# Patient Record
Sex: FEMALE | Race: WHITE | NOT HISPANIC OR LATINO | Employment: OTHER | URBAN - METROPOLITAN AREA
[De-identification: names, ages, dates, MRNs, and addresses within clinical notes are randomized per-mention and may not be internally consistent; named-entity substitution may affect disease eponyms.]

---

## 2023-10-25 ENCOUNTER — EVALUATION (OUTPATIENT)
Dept: PHYSICAL THERAPY | Facility: CLINIC | Age: 78
End: 2023-10-25
Payer: COMMERCIAL

## 2023-10-25 DIAGNOSIS — G89.29 CHRONIC RIGHT SHOULDER PAIN: Primary | ICD-10-CM

## 2023-10-25 DIAGNOSIS — M25.511 CHRONIC RIGHT SHOULDER PAIN: Primary | ICD-10-CM

## 2023-10-25 PROCEDURE — 97161 PT EVAL LOW COMPLEX 20 MIN: CPT | Mod: GP

## 2023-10-25 PROCEDURE — 97140 MANUAL THERAPY 1/> REGIONS: CPT | Mod: GP

## 2023-10-25 NOTE — Clinical Note
October 26, 2023     Patient: Chris Robledo   YOB: 1945   Date of Visit: 10/25/2023       To Whom It May Concern:    It is my medical opinion that Chris Robledo {Work release (duty restriction):32673}.    If you have any questions or concerns, please don't hesitate to call.         Sincerely,        Miles Brantley, PT    CC: No Recipients

## 2023-10-25 NOTE — PROGRESS NOTES
Physical Therapy    Physical Therapy Evaluation    Patient Name: Chris Robledo  MRN: [id number]  Today's Date: 10/26/2023       Assessment  PT Assessment Results: Decreased strength, Decreased range of motion, Decreased endurance, Decreased mobility, Impaired balance  Rehab Prognosis: Fairabdi is a 78 year old F who participated in a physical therapy evaluation today due to chronic R shoulder pain. her impairments include; tenderness, strength, pain, ROM deficits. Due to these impairments, pt has the following functional limitations Overhead activities, lifting, performing household/recreational/ occupational activities, and performing ADLs]. Pt will benefit from continued skilled physical therapy to address above impairments and progress toward therapy related goals.       Plan  Treatment/Interventions: Cryotherapy, Education/ Instruction, Electrical stimulation, Manual therapy, Neuromuscular re-education, Therapeutic activities, Therapeutic exercises  PT Plan: Skilled PT  PT Frequency: Other (Comment) (biweekly)  Duration: 6 weeks  Onset Date: 04/20/23  Number of Treatments Authorized: 30  Rehab Potential: Fair  Plan of Care Agreement: Patient    Current Problem  1. Chronic right shoulder pain  Follow Up In Physical Therapy    CANCELED: Follow Up In Physical Therapy          Subjective   RFV: pt is a 66 y/o F presenting to the clinic with history of chronic R shoulder pain as a result of a fall that lead to a fracture earlier this year. Pt was being seen in PT and was making progress for R shoulder rehab but had to pause PT [place]. Pt would like to return to PT to [facility] to make progress with her R shoulder.   DOS:  Pain: 0/10  Description of symptoms: shoulder feels stiff past 90*   Acute/Chronic: chronic  Functional limitations: OH elevation, lifting, carrying, washing hair    Pt may have potential BPPV which is a new symptom, will get evaluated by MD on Friday- be cautious with  supine exercises.         Objective   Posture:     Range of Motion:  R shoulder flexion: 0-100  R shoulder abudction : 0-82  R shoulder ER: 0-35     Strength:   RUE: 3+/5  LUE: 5/5  Flexibility:     Palpation:    Tenderness noted along R shoulder, R UT  Special Tests:   N/A           OP EDUCATION:  Education  Individual(s) Educated: Patient, Child  Education Provided: Anatomy, Physiology  Risk and Benefits Discussed with Patient/Caregiver/Other: yes  Patient/Caregiver Demonstrated Understanding: yes  Plan of Care Discussed and Agreed Upon: yes  Patient Response to Education: Patient/Caregiver Verbalized Understanding of Information, Patient/Caregiver Performed Return Demonstration of Exercises/Activities, Patient/Caregiver Asked Appropriate Questions      Treatment provided this session:  Manual PROM of R shoulder in all directions  Manual distrcation + inferior glide  AP/PA glide of GH joint grade III to help promote general mobility of  R shoulder  Goals:  Active       PT Problem       Pt will demonstrate 4+/5 B/L UE strength        Start:  10/26/23    Expected End:  01/24/24            pt will demonstrate > 120 deg of shoulder flexion       Start:  10/26/23    Expected End:  01/24/24            Pt will be indep with HEP         Start:  10/26/23    Expected End:  01/24/24            pt will report no greater than 2/10 pain for 3 consecutive days        Start:  10/26/23    Expected End:  01/24/24

## 2023-10-25 NOTE — Clinical Note
October 26, 2023     Patient: Chris Robledo   YOB: 1945   Date of Visit: 10/25/2023       To Whom it May Concern:    Chris Robledo was seen in my clinic on 10/25/2023. She {Return to school/sport:47438}.    If you have any questions or concerns, please don't hesitate to call.         Sincerely,          Miles Brantley, PT        CC: No Recipients

## 2023-10-26 PROBLEM — M25.511 RIGHT SHOULDER PAIN: Status: ACTIVE | Noted: 2023-10-26

## 2023-10-26 ASSESSMENT — ENCOUNTER SYMPTOMS
OCCASIONAL FEELINGS OF UNSTEADINESS: 0
LOSS OF SENSATION IN FEET: 0
DEPRESSION: 0

## 2023-11-08 ENCOUNTER — TREATMENT (OUTPATIENT)
Dept: PHYSICAL THERAPY | Facility: CLINIC | Age: 78
End: 2023-11-08
Payer: COMMERCIAL

## 2023-11-08 DIAGNOSIS — M25.511 CHRONIC RIGHT SHOULDER PAIN: ICD-10-CM

## 2023-11-08 DIAGNOSIS — G89.29 CHRONIC RIGHT SHOULDER PAIN: ICD-10-CM

## 2023-11-08 PROCEDURE — 97110 THERAPEUTIC EXERCISES: CPT | Mod: GP

## 2023-11-08 NOTE — PROGRESS NOTES
Physical Therapy    Physical Therapy Treatment    Patient Name: Chris Robledo  MRN: [id number]  Today's Date: 11/8/2023         Assessment:   Emphasis of todays treatment was to focus on OH elevation with decreased UT compensation, and improving general shoulder stability. Pt is demonstrating an improvement with OH activities. Pt will be placed on hold at this time and continue treatments at home for her R shoulder. Pt will be seen by a vestibular specialist for BPPV.      Plan:    Hold PT for 30 days for R shoulder until 12/08    Current Problem  1. Chronic right shoulder pain  Follow Up In Physical Therapy          Subjective   General  Reason for Referral: R shoulder pain  Referred By: Dr. Robison  Pt reports no pain just stiffness with OH movement       Objective   UT compensation past 110 degrees of shoulder flexion present     Treatments:  Therapeutic Exercise  Therapeutic Exercise Performed: Yes  Therapeutic Exercise Activity 1: UBE x 5 mins  Therapeutic Exercise Activity 2: Pulley system flexion 2 mins  Therapeutic Exercise Activity 3: Pulley sysmte abduction 2 minutes  Therapeutic Exercise Activity 4: straight arm pull down 50# ecc 3 x10  Therapeutic Exercise Activity 5: Physioball wall flexion 2 x 12  Therapeutic Exercise Activity 6: Physioball wall ER iso 5s holds x 10 reps  Therapeutic Exercise Activity 7: Standing Row 65 # 3 x10      Goals:  Active       PT Problem       Pt will demonstrate 4+/5 B/L UE strength        Start:  10/26/23    Expected End:  01/24/24            pt will demonstrate > 120 deg of shoulder flexion       Start:  10/26/23    Expected End:  01/24/24            Pt will be indep with HEP         Start:  10/26/23    Expected End:  01/24/24            pt will report no greater than 2/10 pain for 3 consecutive days        Start:  10/26/23    Expected End:  01/24/24

## 2023-11-21 ENCOUNTER — EVALUATION (OUTPATIENT)
Dept: PHYSICAL THERAPY | Facility: CLINIC | Age: 78
End: 2023-11-21
Payer: COMMERCIAL

## 2023-11-21 DIAGNOSIS — R42 DIZZINESS AND GIDDINESS: ICD-10-CM

## 2023-11-21 DIAGNOSIS — H81.12 BPPV (BENIGN PAROXYSMAL POSITIONAL VERTIGO), LEFT: ICD-10-CM

## 2023-11-21 DIAGNOSIS — R42 DIZZINESS: Primary | ICD-10-CM

## 2023-11-21 PROCEDURE — 97161 PT EVAL LOW COMPLEX 20 MIN: CPT | Mod: GP | Performed by: PHYSICAL THERAPIST

## 2023-11-21 NOTE — PROGRESS NOTES
Physical Therapy Evaluation and Treatment      Patient Name: Chris Robledo  MRN: [id number]  Today's Date: 11/21/2023  Time Calculation  Start Time: 1045  Stop Time: 1115  Time Calculation (min): 30 min  Low complexity due to patient's clinical presentation being stable and uncomplicated by any significant comorbidities that may affect rehab tolerance and progression.    Current Problem:   1. Dizziness  Follow Up In Physical Therapy      2. Dizziness and giddiness  PT eval and treat      3. BPPV (benign paroxysmal positional vertigo), left  Follow Up In Physical Therapy          Subjective    General:  General  Reason for Referral: Dizziness  Referred By: Tamara Hernández  General Comment: Pt's daughter reports at the end of september she went back to [place] and upon arriving she felt very dizzy, like the whole celing was moving. She was unable to get up and walk. She was ok for a couple days and than it started again. She came home and went back to MD who said ti was a problem with her inner ear. She reports that when she bends over and gets up it will be dizzy and worse in AM. She also reports tinnitus in her R ear. She reports the dizziness does not last long, once she is up and sitting it goes away. She does not report any prior history. She does not report any falls.      Objective     Neuro Vestibular:  Neuro Vestibular Comment: Did not perform this date will assess at later date with positive BPPV finding and treatment performed  Positional Testing:  [place]-Halpike Right: negative  [place]-Halpike Left: positive  Outcome Measures:  Other Measures  Dizziness Handicap Inventory: 67       OP EDUCATION:  Outpatient Education  Individual(s) Educated: Patient, Child  Education Provided: Anatomy, Physiology  Risk and Benefits Discussed with Patient/Caregiver/Other: yes  Patient/Caregiver Demonstrated Understanding: yes  Plan of Care Discussed and Agreed Upon: yes  Patient Response to Education: Patient/Caregiver  Verbalized Understanding of Information    Assessment   Assessment:  Assessment Comment: Pt is a 78 y.o female presenting to therapy with acute dizziness. Pt positive with L BPPV findings with epley treatment performed. Further vestibular assessment held this date will assess as appropriate. At this time pt would benefit from skilled physical therapy in order to reduce dizziness and prevent further functional decline or falls.    Plan:  Treatment/Interventions: Canalith repositioning, Neuromuscular re-education  PT Plan: Skilled PT  PT Frequency: 1 time per week  Duration: 4 weeks  Rehab Potential: Good    Goals:  Active       PT Problem       Pt will be 100% IND with HEP in 4 weeks in order to maintain progress with therapy.         Start:  11/21/23    Expected End:  12/21/23            Pt will demonstrate subjective improvement of ADLs and recreational activities through improved score of 40 on DHI in 4 weeks for reduced fall risk.        Start:  11/21/23    Expected End:  12/21/23            Pt will report a reduction in dizziness score to 3/10 in 4 weeks in order to improve reduced fall risk        Start:  11/21/23    Expected End:  12/21/23

## 2023-11-21 NOTE — Clinical Note
November 21, 2023    AJIT Correa, LASHAY  [address] [address] [place] [address]    Patient: Chris Robledo   YOB: 1945   Date of Visit: 11/21/2023       Dear Ajit Correa, Lashay  [address] [place] [address]    The attached plan of care is being sent to you because your patient’s medical reimbursement requires that you certify the plan of care. Your signature is required to allow uninterrupted insurance coverage.      You may indicate your approval by signing below and faxing this form back to us at Dept Fax: [phone].    Please call Dept: [phone] with any questions or concerns.    Thank you for this referral,        Jessica Manuel, UZAIR  [facility] PHYSICIAN DENIA  [facility] PHYSICIAN DENIA  [address] [facility] [address]    Payer: Payor: Critical access hospital / Plan: Critical access hospital / Product Type: *No Product type* /                                                                         Date:     Dear Jessica Manuel, PT,     Re: Ms. Chris Robledo, MRN:[id number]    I certify that I have reviewed the attached plan of care and it is medically necessary for Ms. Chris Robledo (1945) who is under my care.          ______________________________________                    _________________  Provider name and credentials                                           Date and time                                                                                           Plan of Care 11/21/23   Effective from: 11/21/2023  Effective to: 12/21/2023    Plan ID: [id number]            Participants as of Finalize on 11/21/2023    Name Type Comments Contact Info    AJIT Correa, LASHAY PCP - General  [phone]    Jessica Manuel PT Physical Therapist  [phone]       Last Plan Note     Author: Jessica Manuel PT Status: Signed Last edited: 11/21/2023 10:30  AM       Physical Therapy Evaluation and Treatment      Patient Name: Chris Robledo  MRN: [id number]  Today's Date: 11/21/2023  Time Calculation  Start Time: 1045  Stop Time: 1115  Time Calculation (min): 30 min  Low complexity due to patient's clinical presentation being stable and uncomplicated by any significant comorbidities that may affect rehab tolerance and progression.    Current Problem:   1. Dizziness  Follow Up In Physical Therapy      2. Dizziness and giddiness  PT eval and treat      3. BPPV (benign paroxysmal positional vertigo), left  Follow Up In Physical Therapy          Subjective    General:  General  Reason for Referral: Dizziness  Referred By: Tamara Hernández  General Comment: Pt's daughter reports at the end of september she went back to [place] and upon arriving she felt very dizzy, like the whole celing was moving. She was unable to get up and walk. She was ok for a couple days and than it started again. She came home and went back to MD who said ti was a problem with her inner ear. She reports that when she bends over and gets up it will be dizzy and worse in AM. She also reports tinnitus in her R ear. She reports the dizziness does not last long, once she is up and sitting it goes away. She does not report any prior history. She does not report any falls.      Objective     Neuro Vestibular:  Neuro Vestibular Comment: Did not perform this date will assess at later date with positive BPPV finding and treatment performed  Positional Testing:  [place]-Halpike Right: negative  [place]-Halpike Left: positive  Outcome Measures:  Other Measures  Dizziness Handicap Inventory: 67       OP EDUCATION:  Outpatient Education  Individual(s) Educated: Patient, Child  Education Provided: Anatomy, Physiology  Risk and Benefits Discussed with Patient/Caregiver/Other: yes  Patient/Caregiver Demonstrated Understanding: yes  Plan of Care Discussed and Agreed Upon: yes  Patient Response to Education:  Patient/Caregiver Verbalized Understanding of Information    Assessment   Assessment:  Assessment Comment: Pt is a 78 y.o female presenting to therapy with acute dizziness. Pt positive with L BPPV findings with epley treatment performed. Further vestibular assessment held this date will assess as appropriate. At this time pt would benefit from skilled physical therapy in order to reduce dizziness and prevent further functional decline or falls.    Plan:  Treatment/Interventions: Canalith repositioning, Neuromuscular re-education  PT Plan: Skilled PT  PT Frequency: 1 time per week  Duration: 4 weeks  Rehab Potential: Good    Goals:  Active       PT Problem       Pt will be 100% IND with HEP in 4 weeks in order to maintain progress with therapy.         Start:  11/21/23    Expected End:  12/21/23            Pt will demonstrate subjective improvement of ADLs and recreational activities through improved score of 40 on DHI in 4 weeks for reduced fall risk.        Start:  11/21/23    Expected End:  12/21/23            Pt will report a reduction in dizziness score to 3/10 in 4 weeks in order to improve reduced fall risk        Start:  11/21/23    Expected End:  12/21/23                                Current Participants as of 11/21/2023    Name Type Comments Contact Info    Tamara Hernández, APRN-CNP, DNP PCP - General  [phone]    Signature pending    Jessica Manuel, PT Physical Therapist  [phone]

## 2023-11-22 ENCOUNTER — APPOINTMENT (OUTPATIENT)
Dept: PHYSICAL THERAPY | Facility: CLINIC | Age: 78
End: 2023-11-22
Payer: COMMERCIAL

## 2023-11-28 ENCOUNTER — TREATMENT (OUTPATIENT)
Dept: PHYSICAL THERAPY | Facility: CLINIC | Age: 78
End: 2023-11-28
Payer: COMMERCIAL

## 2023-11-28 DIAGNOSIS — R42 DIZZINESS: ICD-10-CM

## 2023-11-28 DIAGNOSIS — H81.12 BPPV (BENIGN PAROXYSMAL POSITIONAL VERTIGO), LEFT: ICD-10-CM

## 2023-11-28 PROCEDURE — 97530 THERAPEUTIC ACTIVITIES: CPT | Mod: GP,CQ

## 2023-11-28 NOTE — PROGRESS NOTES
Physical Therapy Treatment    Patient Name: Chris Robledo  MRN: [id number]  Today's Date: 11/28/2023  PT Therapeutic Procedures Time Entry  Therapeutic Activity Time Entry: 30  Visit # 2/4    Current Problem   1. Dizziness  Follow Up In Physical Therapy      2. BPPV (benign paroxysmal positional vertigo), left  Follow Up In Physical Therapy          Subjective   General    Patient reports after she left her last therapy session, she laid down in bed and got dizzy again. Since then, she has had mild dizziness upon waking up in the mornings. Daughter states patient is also having neck pain and stiffness lately.     Precautions:   L BPPV    Pain    No pain- 2/10 dizziness  Post Treatment Pain Level   0/10    Objective   (+) L BPPV    Limted cervical ROM all directions    Treatments:  Therapeutic Activity  Therapeutic Activity Performed: Yes  Therapeutic Activity 1: L epley maneuver 2x       Assessment   Assessment:    0/10 dizziness post epley, s/s during maneuver but resolving quickly after. No nystagmus present. Patient continues to have mild residual dizziness which would be cervicogenic, residual crystals, or age related s/s. Will follow up with supervising PT.     Plan:    Continue per PT POC as directed by PT.    OP EDUCATION:   Use of recliner after session with hydration throughout the day.     Goals:   Active       PT Problem       Pt will be 100% IND with HEP in 4 weeks in order to maintain progress with therapy.         Start:  11/21/23    Expected End:  12/21/23            Pt will demonstrate subjective improvement of ADLs and recreational activities through improved score of 40 on DHI in 4 weeks for reduced fall risk.        Start:  11/21/23    Expected End:  12/21/23            Pt will report a reduction in dizziness score to 3/10 in 4 weeks in order to improve reduced fall risk        Start:  11/21/23    Expected End:  12/21/23

## 2023-12-06 ENCOUNTER — APPOINTMENT (OUTPATIENT)
Dept: PHYSICAL THERAPY | Facility: CLINIC | Age: 78
End: 2023-12-06
Payer: COMMERCIAL

## 2023-12-06 ENCOUNTER — TREATMENT (OUTPATIENT)
Dept: PHYSICAL THERAPY | Facility: CLINIC | Age: 78
End: 2023-12-06
Payer: COMMERCIAL

## 2023-12-06 DIAGNOSIS — H81.12 BPPV (BENIGN PAROXYSMAL POSITIONAL VERTIGO), LEFT: ICD-10-CM

## 2023-12-06 DIAGNOSIS — R42 DIZZINESS: ICD-10-CM

## 2023-12-06 PROCEDURE — 97530 THERAPEUTIC ACTIVITIES: CPT | Mod: GP | Performed by: PHYSICAL THERAPIST

## 2023-12-06 PROCEDURE — 95992 CANALITH REPOSITIONING PROC: CPT | Mod: GP | Performed by: PHYSICAL THERAPIST

## 2023-12-06 NOTE — PROGRESS NOTES
Physical Therapy Treatment    Patient Name: Chris Robledo  MRN: [id number]  Today's Date: 12/6/2023  Time Calculation  Start Time: 1020  Stop Time: 1045  Time Calculation (min): 25 min  PT Modalities Time Entry  Canalith Repositioning Time Entry: 10  PT Therapeutic Procedures Time Entry  Therapeutic Activity Time Entry: 15  Visit # 3/5    Current Problem   1. Dizziness  Follow Up In Physical Therapy    Follow Up In Physical Therapy      2. BPPV (benign paroxysmal positional vertigo), left  Follow Up In Physical Therapy    Follow Up In Physical Therapy          Subjective   General   General Comment: Pt reports she feeling better. She is having slight dizziness in the morning when she gets up but it feels different.      Objective   Smooth pursuit  - WNL  Saccades - WNL   VOR - WNL   Eye range - WNL   BPPV  - L positive     Treatments:  Therapeutic Activity  Therapeutic Activity Performed: Yes  Therapeutic Activity 1: L epley maneuver 2x  Therapeutic Activity 2: Assessemtn/screen of vestibular       Assessment   Assessment:   PT Assessment  Assessment Comment: Pt did not demonstrate any abnormalities with vestibular screen other than L BPPV , noted improvement with findings after initial epley. Will continue as toelrated with epley manuever.    Plan:    Continue BPPV assessment      Goals:   Active       PT Problem       Pt will be 100% IND with HEP in 4 weeks in order to maintain progress with therapy.         Start:  11/21/23    Expected End:  12/21/23            Pt will demonstrate subjective improvement of ADLs and recreational activities through improved score of 40 on DHI in 4 weeks for reduced fall risk.        Start:  11/21/23    Expected End:  12/21/23            Pt will report a reduction in dizziness score to 3/10 in 4 weeks in order to improve reduced fall risk        Start:  11/21/23    Expected End:  12/21/23

## 2023-12-11 ENCOUNTER — TREATMENT (OUTPATIENT)
Dept: PHYSICAL THERAPY | Facility: CLINIC | Age: 78
End: 2023-12-11
Payer: COMMERCIAL

## 2023-12-11 DIAGNOSIS — R42 DIZZINESS: Primary | ICD-10-CM

## 2023-12-11 DIAGNOSIS — H81.12 BPPV (BENIGN PAROXYSMAL POSITIONAL VERTIGO), LEFT: ICD-10-CM

## 2023-12-11 PROCEDURE — 95992 CANALITH REPOSITIONING PROC: CPT | Mod: GP | Performed by: PHYSICAL THERAPIST

## 2023-12-11 NOTE — PROGRESS NOTES
Physical Therapy Treatment    Patient Name: Chris Robledo  MRN: [id number]  Today's Date: 12/11/2023  Time Calculation  Start Time: 0845  Stop Time: 0900  Time Calculation (min): 15 min  PT Modalities Time Entry  Canalith Repositioning Time Entry: 15  Visit # 4/5    Current Problem   1. Dizziness  Follow Up In Physical Therapy          Subjective   General   General Comment: Pt feels better, she did have one moment when she was in the bathroom where she got dizzy but otherwise no problems in the morning.    Objective   L BPPV +    Treatments:  Therapeutic Activity  Therapeutic Activity Performed: Yes  Therapeutic Activity 1: L epley maneuver 2x       Assessment   Assessment:   PT Assessment  Assessment Comment: Pt and family report improvement however L side BPPV continues to demonstrate positivefinding however very quick to resolve during repositioning. Did discuss different causes for continued positive finding, will perform reassessment in 2-3 weeks.    Plan:    Return in 2-3 weeks for reassessment     OP EDUCATION:   BPPV and vestibular system     Goals:   Active       PT Problem       Pt will be 100% IND with HEP in 4 weeks in order to maintain progress with therapy.         Start:  11/21/23    Expected End:  12/21/23            Pt will demonstrate subjective improvement of ADLs and recreational activities through improved score of 40 on DHI in 4 weeks for reduced fall risk.        Start:  11/21/23    Expected End:  12/21/23            Pt will report a reduction in dizziness score to 3/10 in 4 weeks in order to improve reduced fall risk        Start:  11/21/23    Expected End:  12/21/23

## 2024-01-08 ENCOUNTER — APPOINTMENT (OUTPATIENT)
Dept: PHYSICAL THERAPY | Facility: CLINIC | Age: 79
End: 2024-01-08
Payer: COMMERCIAL

## 2024-01-08 ENCOUNTER — TREATMENT (OUTPATIENT)
Dept: PHYSICAL THERAPY | Facility: CLINIC | Age: 79
End: 2024-01-08
Payer: COMMERCIAL

## 2024-01-08 DIAGNOSIS — R42 DIZZINESS: ICD-10-CM

## 2024-01-08 PROCEDURE — 97530 THERAPEUTIC ACTIVITIES: CPT | Mod: GP | Performed by: PHYSICAL THERAPIST

## 2024-01-08 NOTE — PROGRESS NOTES
Physical Therapy Discharge Treatment    Patient Name: Chris Robledo  MRN: [id number]  Today's Date: 1/8/2024  Time Calculation  Start Time: 0806  Stop Time: 0817  Time Calculation (min): 11 min  PT Therapeutic Procedures Time Entry  Therapeutic Activity Time Entry: 11  Visit # 5/5    Current Problem   1. Dizziness  Follow Up In Physical Therapy          Subjective   General   General Comment: Pt has been out of therapy for about 3 weeks and returns reporting does not report any spinning sensation. She occasionally has dizziness but it is not abnormal for her.  Precautions:   None  Pain    0  Post Treatment Pain Level 0    Objective   [place] hallpike L   Normal vestibular screen    Treatments:  Therapeutic Activity  Therapeutic Activity Performed: Yes  Therapeutic Activity 1: L epley maneuver 2x  Therapeutic Activity 2: Educated on vestibular system       Assessment   Assessment:   PT Assessment  Assessment Comment: Pt returned to therapy to check and make sure everything was ok as previsouly discussed. She had slight response with initial L rob hallpike which was cleared by epley, pt did not report any large symptoms through and second check was clear. Pt, family and PT discussed and feel confident on discharge at this time as pt has returned to prior level of function with only minimal dizziness in AM which is quick to resolve and pt reporting this was normal finding.    Plan:    Discharge at this time    OP EDUCATION:   Educated on return to prior level of function    Goals:   Resolved       PT Problem       Pt will be 100% IND with HEP in 4 weeks in order to maintain progress with therapy.   (Met)       Start:  11/21/23    Expected End:  12/21/23    Resolved:  01/08/24         Pt will demonstrate subjective improvement of ADLs and recreational activities through improved score of 40 on DHI in 4 weeks for reduced fall risk.  (Met)       Start:  11/21/23    Expected End:  12/21/23    Resolved:  01/08/24         Pt will  report a reduction in dizziness score to 3/10 in 4 weeks in order to improve reduced fall risk  (Met)       Start:  11/21/23    Expected End:  12/21/23    Resolved:  01/08/24

## 2024-07-02 ENCOUNTER — DOCUMENTATION (OUTPATIENT)
Dept: PHYSICAL THERAPY | Facility: CLINIC | Age: 79
End: 2024-07-02
Payer: COMMERCIAL

## 2024-08-28 ENCOUNTER — APPOINTMENT (OUTPATIENT)
Dept: OTOLARYNGOLOGY | Facility: CLINIC | Age: 79
End: 2024-08-28
Payer: COMMERCIAL

## 2024-08-28 ENCOUNTER — APPOINTMENT (OUTPATIENT)
Dept: AUDIOLOGY | Facility: CLINIC | Age: 79
End: 2024-08-28
Payer: COMMERCIAL

## 2024-08-28 DIAGNOSIS — R42 DIZZINESS: ICD-10-CM

## 2024-08-28 DIAGNOSIS — M77.9 TENDINITIS: Primary | ICD-10-CM

## 2024-08-28 DIAGNOSIS — H93.13 TINNITUS OF BOTH EARS: ICD-10-CM

## 2024-08-28 DIAGNOSIS — H90.3 SENSORINEURAL HEARING LOSS (SNHL) OF BOTH EARS: Primary | ICD-10-CM

## 2024-08-28 DIAGNOSIS — H93.11 TINNITUS, RIGHT EAR: ICD-10-CM

## 2024-08-28 DIAGNOSIS — H81.12 BENIGN PAROXYSMAL POSITIONAL VERTIGO OF LEFT EAR: ICD-10-CM

## 2024-08-28 PROCEDURE — 92550 TYMPANOMETRY & REFLEX THRESH: CPT | Performed by: AUDIOLOGIST

## 2024-08-28 PROCEDURE — 1159F MED LIST DOCD IN RCRD: CPT | Performed by: OTOLARYNGOLOGY

## 2024-08-28 PROCEDURE — 92557 COMPREHENSIVE HEARING TEST: CPT | Performed by: AUDIOLOGIST

## 2024-08-28 PROCEDURE — 99204 OFFICE O/P NEW MOD 45 MIN: CPT | Performed by: OTOLARYNGOLOGY

## 2024-08-28 NOTE — PROGRESS NOTES
"History Of Present Illness  Chris Robledo is a 79 y.o. female presenting for: \"Ear noises\".  She is kindly referred by Tamara Hernández, APRN-CNP, DNP.    She developed vertigo about a year ago, during/after a long flight. She still has little bit at left. She received physical therapy. Doing better.  For the past 4-5 months she has tinnitus, it is bilateral.   Decreased hearing (+), bilateral presbyacusis.        On examination, TMs look intact  Nasal mucosa is pale, septum is deviated to left  Throat: mallampati class 3-4  Tenderness (+) at left hyoid.  [place] Hallpike (+) at left side, left epley maneuver was performed.    Dx:  1- Tinnitus, presbyacusis  2- tendinitis at left anterior neck   3- left BPPV    Recommendations:  1- consider lenire device for tinnitus  2- follow up in 2 weeks,   3- home epley exercises  4- consider local steroid injection for tendinitis in neck     Past Medical History  She has no past medical history on file.    Surgical History  She has no past surgical history on file.     Social History  She has no history on file for tobacco use, alcohol use, and drug use.    Family History  No family history on file.     Allergies  Patient has no allergy information on record.    Review of Systems   Feeling tired  Blurry vision  Vertigo  Tinnitus  Dry mouth/mouth breathing  Dizziness upon standing  Heartburn  Urine retention       Physical Exam    General appearance: Healthy-appearing, well-nourished, well groomed, in no acute distress.     Head and Face: Atraumatic with no masses, lesions, or scarring.      Salivary glands: No tenderness of the parotid glands or parotid masses.     No tenderness of the submandibular glands or submandibular masses.      Facial strength: Normal strength and symmetry, no synkinesis or facial tic.     Eyes: Conjunctivas look non-hyperemic bilaterally    Ears: Bilaterally ear canals look normal. Tympanic membranes look intact, no hyperemia, fluid or retraction. " "Hearing grossly normal.      Nose: Mucosa looks normal. No purulent discharge.      Oral Cavity/Mouth: Lips and tongue look normal.     Throat: No postnasal discharge. No tonsil hypertrophy. No hyperemia.    Neck: Symmetrical, trachea midline.     Pulmonary: Normal respiratory effort.     Lymphatic: No palpable pathologic lymph nodes at neck.     Neurological/Psychiatric Orientation to person, place, and time: Normal.     Mood and affect: Normal.      Extremities: No clubbing.     Skin: No significant skin lesions were noted at face or neck        Procedure         Last Recorded Vitals  There were no vitals taken for this visit.    Relevant Results    Assessment and Plan:  Chris Robledo is a 79 y.o. female presenting for: \"Ear noises\".  She is kindly referred by Tamara Hernández, APRN-CNP, DNP.    She developed vertigo about a year ago, during/after a long flight. She still has little bit at left. She received physical therapy. Doing better.  For the past 4-5 months she has tinnitus, it is bilateral.   Decreased hearing (+), bilateral presbyacusis.      On examination, TMs look intact  Nasal mucosa is pale, septum is deviated to left  Throat: mallampati class 3-4  Tenderness (+) at left hyoid.  [place] Hallpike (+) at left side, left epley maneuver was performed.    Dx:  1- Tinnitus, presbyacusis  2- tendinitis at left anterior neck   3- left BPPV    Recommendations:  1- consider lenire device for tinnitus  2- follow up in 2 weeks,   3- home epley exercises  4- consider local steroid injection for tendinitis in neck    [facility]  Otolaryngology - Head & Neck Surgery  "

## 2024-08-28 NOTE — PROGRESS NOTES
AUDIOLOGY ADULT AUDIOMETRIC EVALUATION    Name:  Chris Robledo  :  1945  Age:  79 y.o.  Date of Evaluation:  2024    Reason for visit: Ms. Robledo is seen in the clinic today at the request of otolaryngology for an audiologic evaluation.     HISTORY  Patient complains of hearing loss, tinnitus and dizziness as explained by daughter, Carmen.She denies fullness and has had balance therapy in past and wears hearing aids.    EVALUATION  See scanned audiogram: “Media” > “Audiology Report”.      RESULTS  Otoscopic Evaluation:  Right Ear: clear ear canal  Left Ear: clear ear canal    Immittance Measures:  Tympanometry:  Right Ear: Type A, normal tympanic membrane mobility with normal middle ear pressure   Left Ear: Type A, normal tympanic membrane mobility with normal middle ear pressure     Acoustic Reflexes:  Ipsilateral Right Ear:  NR NR NR NR  Ipsilateral Left Ear:  100 100 100 100   Contralateral Right Ear: did not evaluate  Contralateral Left Ear: did not evaluate    Distortion Product Otoacoustic Emissions (DPOAEs):  Right Ear: REFER 1-8 K HZ  Left Ear:   REFER 1-8K HZ    Audiometry:  Test Technique and Reliability:  BEHAVIORAL   Standard audiometry via supra-aural headphones. Reliability is  FAIR good.    Pure tone air and bone conduction audiometry:  Right Ear:  MILD MODERATE TO SEVERE SNHL .  Left Ear:    MILD MODERATE TO SEVERE SNHL.    Speech Audiometry (Word Recognition Scores):   Right Ear:  80% GOOD   Left Ear:    80% GOOD    IMPRESSIONS  MILD MODERATE TO SEVERE SNHL WITH TINNITUS AND DIZZINESS.  The presence of acoustic reflexes within normal intensity limits is consistent with normal middle ear and brainstem function, and suggests that auditory sensitivity is not significantly impaired. An elevated or absent acoustic reflex threshold is consistent with a middle ear disorder, hearing loss in the stimulated ear, and/or interruption of neural innervation of the stapedius muscle. Present DPOAEs  suggest normal/near normal cochlear outer hair cell function and are consistent with no greater than a mild hearing loss at those frequencies. Absent DPOAEs are consistent with abnormal cochlear outer hair cell function and some degree of hearing loss at those frequencies.    RECOMMENDATIONS  - Follow up with otolaryngology today as scheduled.Special tests for dizzy and tinnitus.   - Audiologic evaluation as needed.  - Annual audiologic evaluation, sooner if an acute change is noted.  - Audiologic evaluation in conjunction with otologic care, if an acute change is noted, and/or annually.  - Continued hearing aid use.  - Follow-up with audiology annually for routine hearing aid maintenance, sooner if questions/problems arise.  - Follow-up with medical care team as planned.    PATIENT EDUCATION  Discussed results, impressions and recommendations with the patient. Questions were addressed and the patient was encouraged to contact our office should concerns arise.    Time for this encounter: 35 minutes    Judie Gandara  Licensed Audiologist

## 2024-08-28 NOTE — LETTER
"August 28, 2024     Tamara T Betty, APRN-CNP, DNP  [address]  [facility] [address]    Patient: Chris Robledo   YOB: 1945   Date of Visit: 8/28/2024       Dear AJIT Marie DNP:    Thank you for referring Chris Robledo to me for evaluation. Below are my notes for this consultation.  If you have questions, please do not hesitate to call me. I look forward to following your patient along with you.       Sincerely,     Trent Andersen MD      CC: No Recipients  ______________________________________________________________________________________    History Of Present Illness  Chris Robledo is a 79 y.o. female presenting for: \"Ear noises\".  She is kindly referred by AJIT Hong DNP.    She developed vertigo about a year ago, during/after a long flight. She still has little bit at left. She received physical therapy. Doing better.  For the past 4-5 months she has tinnitus, it is bilateral.   Decreased hearing (+), bilateral presbyacusis.        On examination, TMs look intact  Nasal mucosa is pale, septum is deviated to left  Throat: mallampati class 3-4  Tenderness (+) at left hyoid.  Faust[place] Hallpike (+) at left side, left epley maneuver was performed.    Dx:  1- Tinnitus, presbyacusis  2- tendinitis at left anterior neck   3- left BPPV    Recommendations:  1- consider lenire device for tinnitus  2- follow up in 2 weeks,   3- home epley exercises  4- consider local steroid injection for tendinitis in neck     Past Medical History  She has no past medical history on file.    Surgical History  She has no past surgical history on file.     Social History  She has no history on file for tobacco use, alcohol use, and drug use.    Family History  No family history on file.     Allergies  Patient has no allergy information on record.    Review of Systems   Feeling tired  Blurry vision  Vertigo  Tinnitus  Dry mouth/mouth breathing  Dizziness upon standing  Heartburn  Urine " "retention       Physical Exam    General appearance: Healthy-appearing, well-nourished, well groomed, in no acute distress.     Head and Face: Atraumatic with no masses, lesions, or scarring.      Salivary glands: No tenderness of the parotid glands or parotid masses.     No tenderness of the submandibular glands or submandibular masses.      Facial strength: Normal strength and symmetry, no synkinesis or facial tic.     Eyes: Conjunctivas look non-hyperemic bilaterally    Ears: Bilaterally ear canals look normal. Tympanic membranes look intact, no hyperemia, fluid or retraction. Hearing grossly normal.      Nose: Mucosa looks normal. No purulent discharge.      Oral Cavity/Mouth: Lips and tongue look normal.     Throat: No postnasal discharge. No tonsil hypertrophy. No hyperemia.    Neck: Symmetrical, trachea midline.     Pulmonary: Normal respiratory effort.     Lymphatic: No palpable pathologic lymph nodes at neck.     Neurological/Psychiatric Orientation to person, place, and time: Normal.     Mood and affect: Normal.      Extremities: No clubbing.     Skin: No significant skin lesions were noted at face or neck        Procedure         Last Recorded Vitals  There were no vitals taken for this visit.    Relevant Results    Assessment and Plan:  Chris Robledo is a 79 y.o. female presenting for: \"Ear noises\".  She is kindly referred by Tamara Hernández, APRN-CNP, DNP.    She developed vertigo about a year ago, during/after a long flight. She still has little bit at left. She received physical therapy. Doing better.  For the past 4-5 months she has tinnitus, it is bilateral.   Decreased hearing (+), bilateral presbyacusis.      On examination, TMs look intact  Nasal mucosa is pale, septum is deviated to left  Throat: mallampati class 3-4  Tenderness (+) at left hyoid.  Fausto Hallpike (+) at left side, left epley maneuver was performed.    Dx:  1- Tinnitus, presbyacusis  2- tendinitis at left anterior neck   3- left " BPPV    Recommendations:  1- consider lenire device for tinnitus  2- follow up in 2 weeks,   3- home epley exercises  4- consider local steroid injection for tendinitis in neck    Trent Andersen  Otolaryngology - Head & Neck Surgery

## 2024-09-11 ENCOUNTER — APPOINTMENT (OUTPATIENT)
Dept: OTOLARYNGOLOGY | Facility: CLINIC | Age: 79
End: 2024-09-11
Payer: COMMERCIAL

## 2025-01-15 ENCOUNTER — APPOINTMENT (OUTPATIENT)
Dept: OTOLARYNGOLOGY | Facility: CLINIC | Age: 80
End: 2025-01-15
Payer: COMMERCIAL

## 2025-01-15 VITALS — TEMPERATURE: 97.3 F | HEIGHT: 60 IN | WEIGHT: 152 LBS | BODY MASS INDEX: 29.84 KG/M2

## 2025-01-15 DIAGNOSIS — H81.11 BENIGN PAROXYSMAL POSITIONAL VERTIGO OF RIGHT EAR: Primary | ICD-10-CM

## 2025-01-15 PROCEDURE — 99213 OFFICE O/P EST LOW 20 MIN: CPT | Performed by: OTOLARYNGOLOGY

## 2025-01-15 PROCEDURE — 1159F MED LIST DOCD IN RCRD: CPT | Performed by: OTOLARYNGOLOGY

## 2025-01-15 PROCEDURE — 95992 CANALITH REPOSITIONING PROC: CPT | Performed by: OTOLARYNGOLOGY

## 2025-01-15 PROCEDURE — 1036F TOBACCO NON-USER: CPT | Performed by: OTOLARYNGOLOGY

## 2025-01-15 RX ORDER — NAPROXEN SODIUM 220 MG/1
81 TABLET, FILM COATED ORAL
COMMUNITY

## 2025-01-15 NOTE — PROGRESS NOTES
"History Of Present Illness    01.15.2025: She was feeling well, about a month ago, she started to feel dizzy again (positional).  [place]-Hallpike maneuver has shown vertigo with nystagmus at right.  Right Epley maneuver was performed.    Plan  1-BPPV recommendations  2-follow-up as needed  _________________________________________________________________    08.28.2024: Chris Robledo is a 79 y.o. female presenting for: \"Ear noises\".  She is kindly referred by Tamara Hernández, APRN-CNP, DNP.    She developed vertigo about a year ago, during/after a long flight. She still has little bit at left. She received physical therapy. Doing better.  For the past 4-5 months she has tinnitus, it is bilateral.   Decreased hearing (+), bilateral presbyacusis.      On examination, TMs look intact  Nasal mucosa is pale, septum is deviated to left  Throat: mallampati class 3-4  Tenderness (+) at left hyoid.  Fausto Hallpike (+) at left side, left epley maneuver was performed.    Dx:  1- Tinnitus, presbyacusis  2- tendinitis at left anterior neck   3- left BPPV    Recommendations:  1- consider lenire device for tinnitus  2- follow up in 2 weeks,   3- home epley exercises  4- consider local steroid injection for tendinitis in neck     Past Medical History  She has no past medical history on file.    Surgical History  She has no past surgical history on file.     Social History  She reports that she has never smoked. She has never used smokeless tobacco. No history on file for alcohol use and drug use.    Family History  No family history on file.     Allergies  Patient has no known allergies.    Review of Systems   Feeling tired  Blurry vision  Vertigo  Tinnitus  Dry mouth/mouth breathing  Dizziness upon standing  Heartburn  Urine retention       Physical Exam    General appearance: Healthy-appearing, well-nourished, well groomed, in no acute distress.     Head and Face: Atraumatic with no masses, lesions, or scarring.      Salivary glands: " "No tenderness of the parotid glands or parotid masses.     No tenderness of the submandibular glands or submandibular masses.      Facial strength: Normal strength and symmetry, no synkinesis or facial tic.     Eyes: Conjunctivas look non-hyperemic bilaterally    Ears: Bilaterally ear canals look normal. Tympanic membranes look intact, no hyperemia, fluid or retraction. Hearing grossly normal.      Nose: Mucosa looks normal. No purulent discharge.      Oral Cavity/Mouth: Lips and tongue look normal.     Throat: No postnasal discharge. No tonsil hypertrophy. No hyperemia.    Neck: Symmetrical, trachea midline.     Pulmonary: Normal respiratory effort.     Lymphatic: No palpable pathologic lymph nodes at neck.     Neurological/Psychiatric Orientation to person, place, and time: Normal.     Mood and affect: Normal.      Extremities: No clubbing.     Skin: No significant skin lesions were noted at face or neck        Procedure    CHINTAN HALLPIKE TEST and EPLEY MANEUVER 01.15.2025  Patient was placed on examination table, head was rotated towards her right side ~45 degrees, and laid back. Head was extended. Dizziness with nystagmus was observed. After dizziness nystagmus has stopped, patient's head was rotated ~90 degrees towards left side, and then further rotated ~90 degrees to left. Then, keeping the head stable patient was sat up. Maneuver was completed.       Last Recorded Vitals  Temperature 36.3 °C (97.3 °F), height 1.524 m (5'), weight 68.9 kg (152 lb).    Relevant Results    Assessment and Plan:    01.15.2025: She was feeling well, about a month ago, she started to feel dizzy again (positional).  Chint[place]-Hallpike maneuver has shown vertigo with nystagmus at right.  Right Epley maneuver was performed.    Plan  1-BPPV recommendations  2-follow-up as needed  _________________________________________________________________    Chris Robledo is a 79 y.o. female presenting for: \"Ear noises\".  She is kindly referred by " Tamara Hernández, APRN-CNP, DNP.    She developed vertigo about a year ago, during/after a long flight. She still has little bit at left. She received physical therapy. Doing better.  For the past 4-5 months she has tinnitus, it is bilateral.   Decreased hearing (+), bilateral presbyacusis.      On examination, TMs look intact  Nasal mucosa is pale, septum is deviated to left  Throat: mallampati class 3-4  Tenderness (+) at left hyoid.  [place] Hallpike (+) at left side, left epley maneuver was performed.    Dx:  1- Tinnitus, presbyacusis  2- tendinitis at left anterior neck   3- left BPPV    Recommendations:  1- consider lenire device for tinnitus  2- follow up in 2 weeks,   3- home epley exercises  4- consider local steroid injection for tendinitis in neck    Trent Andersen  Otolaryngology - Head & Neck Surgery